# Patient Record
Sex: FEMALE | Race: BLACK OR AFRICAN AMERICAN | Employment: FULL TIME | URBAN - METROPOLITAN AREA
[De-identification: names, ages, dates, MRNs, and addresses within clinical notes are randomized per-mention and may not be internally consistent; named-entity substitution may affect disease eponyms.]

---

## 2017-03-15 ENCOUNTER — OFFICE VISIT (OUTPATIENT)
Dept: ORTHOPEDIC SURGERY | Age: 47
End: 2017-03-15

## 2017-03-15 VITALS
RESPIRATION RATE: 18 BRPM | SYSTOLIC BLOOD PRESSURE: 197 MMHG | TEMPERATURE: 97.9 F | HEART RATE: 88 BPM | OXYGEN SATURATION: 99 % | HEIGHT: 65 IN | WEIGHT: 282 LBS | DIASTOLIC BLOOD PRESSURE: 105 MMHG | BODY MASS INDEX: 46.98 KG/M2

## 2017-03-15 DIAGNOSIS — M46.1 SACROILIITIS (HCC): ICD-10-CM

## 2017-03-15 DIAGNOSIS — M54.50 PAIN OF LUMBAR SPINE: ICD-10-CM

## 2017-03-15 DIAGNOSIS — M48.061 LUMBAR STENOSIS: Primary | ICD-10-CM

## 2017-03-15 RX ORDER — LISINOPRIL AND HYDROCHLOROTHIAZIDE 20; 25 MG/1; MG/1
TABLET ORAL
Refills: 1 | COMMUNITY
Start: 2017-02-02

## 2017-03-15 RX ORDER — GABAPENTIN 300 MG/1
300 CAPSULE ORAL 3 TIMES DAILY
Qty: 90 CAP | Refills: 2 | Status: SHIPPED | OUTPATIENT
Start: 2017-03-15 | End: 2017-03-29 | Stop reason: DRUGHIGH

## 2017-03-15 RX ORDER — ACETAMINOPHEN 325 MG/1
TABLET ORAL
COMMUNITY

## 2017-03-15 NOTE — PROGRESS NOTES
[address], [address]  [place], [address] [place] [address] Street  Phone: [phone]  Fax: [phone]        Aixa Tillman  : 1970  PCP: Barbara Galeano MD  3/15/2017    NEW PATIENT      ASSESSMENT AND PLAN     Onel Vargas comes in to the office today c/o 6/10 lumbar pain that radiates into her bilateral thighs x 5 years. She was previously seeing Dr. Enrique Hernandez who diagnosed her with severe L5-S1 lumbar stenosis and presented a surgical option which she was disinterested in at the time. Pt received a single lumbar epidural injection which provided brief relief. She states she lost her job and was unable to see him again for further treatment because she no longer had insurance. She returns today for treatment of the same problem. She was referred for a lumbar MRI for further evaluation of her current symptoms. Pt was prescribed Gabapentin 300 mg TID. I may reevaluate her potential sacroiliitis at her next visit as well. She was advised to speak to her PCP about her high BP. Pt will f/u in 2 weeks or prn. Rolyrodney Armstrong was seen today for back pain and new patient. Diagnoses and all orders for this visit:    Lumbar stenosis  -     gabapentin (NEURONTIN) 300 mg capsule; Take 1 Cap by mouth three (3) times daily.  -     MRI LUMB SPINE WO CONT; Future    Sacroiliitis (HCC)    Pain of lumbar spine  -     [[id number]] L/S 2-3 views         Follow-up Disposition:  Return in about 2 weeks (around 3/29/2017), or if symptoms worsen or fail to improve. CHIEF COMPLAINT  Onel Vargas is seen today in consultation at the request of BONI Beatty MD for complaints of lumbar pain. HISTORY OF PRESENT ILLNESS  Onel Vargas is a 40 y.o. female c/o 6/10 lumbar pain that radiates into her bilateral thighs x 5 years.  She was previously seeing Dr. Enrique Hernandez who diagnosed her with severe L5-S1 lumbar stenosis and presented a surgical option which she was disinterested in at the time. She states she lost her job and was unable to see him again for further treatment because she no longer had insurance. Sitting, standing, lying down, walking, lifting, and bending forward exacerbate her pain. Changing positions, coughing, and sneezing relieve her pain. She previously had a lumbar injection which provided brief relief. Pt denies any fevers, chills, nausea, vomiting. Pt denies any chest pain and shortness of breath. Pt denies any ear, nose, and throat problems. Pt denies any fecal or urinary incontinence. She is not working. Accompanied by friends. Excerpt from Dr. Curiel London note on 3/24/2012: The patient was seen today for follow-up of low back pain into the left lower extremity extending in an S1 distribution. MRI per report revealed at the L5-S1 level there was severe canal stenosis due to epidural fat hypertrophy with a congenitally narrowed canal and a central disc protrusion. The disc protrusion was contacting upon the S1 nerve roots bilaterally. At her last clinic appointment, she was doing well after a block. She was to continue with Topamax. I returned her to work full duty. She followed back up with the office roughly one week later with an increase in pain. I started her on a Medrol Dosepak, Ultram and Flexeril. She reports she had to discontinue the TOPAMAX due to developing a rash. She reports increased pain since discontinuation of the Topamax. She rates pain to be a 4/10. She reports she can only stand for approximately one hour prior to onset of symptoms. Pain is now extending primarily to the right lower back and into the right lower extremity. Physical examination: The patient is in no apparent distress and is alert and oriented x 3. Sensation is intact to light touch throughout. Motor examination reveals 4+/5 strength throughout the bilateral lower extremities. Straight leg raise is negative bilaterally.     Assessment/plan: She is not interested in surgical intervention. I am going to set her up for a repeat block. I will try her on Neurontin. I will see the patient back after the block. PAST MEDICAL HISTORY   Past Medical History:   Diagnosis Date    Hypertension        History reviewed. No pertinent surgical history. MEDICATIONS        ALLERGIES  No Known Allergies       SOCIAL HISTORY    Social History     Social History    Marital status: LEGALLY      Spouse name: N/A    Number of children: N/A    Years of education: N/A     Social History Main Topics    Smoking status: Never Smoker    Smokeless tobacco: Never Used    Alcohol use No    Drug use: None    Sexual activity: Not Asked     Other Topics Concern    None     Social History Narrative    None       FAMILY HISTORY  No family history on file. REVIEW OF SYSTEMS  Review of Systems   Constitutional: Positive for diaphoresis (night sweats). Negative for chills, fever, malaise/fatigue and weight loss. Respiratory: Negative for shortness of breath. Cardiovascular: Negative for chest pain and leg swelling. Gastrointestinal: Negative for constipation, nausea and vomiting. Neurological: Negative for dizziness, tingling, seizures, loss of consciousness and headaches. Psychiatric/Behavioral: The patient does not have insomnia.           PHYSICAL EXAMINATION  Visit Vitals    BP (!) 197/105    Pulse 88    Temp 97.9 °F (36.6 °C) (Oral)    Resp 18    Ht 5' 5\" (1.651 m)    Wt 282 lb (127.9 kg)    SpO2 99%    BMI 46.93 kg/m2         Pain Assessment  3/15/2017   Location of Pain Back   Location Modifiers (No Data)   Severity of Pain 6   Quality of Pain Aching   Quality of Pain Comment N/T legs   Duration of Pain Persistent   Frequency of Pain Constant   Date Pain First Started (No Data)   Aggravating Factors Standing   Aggravating Factors Comment sitting, lying, lifting, bending   Limiting Behavior Yes         Constitutional:  Well developed, well nourished, in no acute distress. Psychiatric: Affect and mood are appropriate. HEENT: Normocephalic, atraumatic. Extraocular movements intact. Integumentary: No rashes or abrasions noted on exposed areas. Cardiovascular: Regular rate and rhythm. Pulmonary: Clear to auscultation bilaterally. SPINE/MUSCULOSKELETAL EXAM    Cervical spine:  Neck is midline. Normal muscle tone. No focal atrophy is noted. ROM pain free. Shoulder ROM intact. Mild tenderness to palpation. Negative Spurling's sign. Negative Tinel's sign. Negative Zazueta's sign. Sensation in the bilateral arms grossly intact to light touch. Lumbar spine:  No rash, ecchymosis, or gross obliquity. No fasciculations. No focal atrophy is noted. No pain with hip ROM. Full range of motion. Tenderness to palpation. No tenderness to palpation at the sciatic notch. Bilateral SI joints tender. Trochanters non tender. Sensation in the bilateral legs grossly intact to light touch. MOTOR:      Biceps  Triceps Deltoids Wrist Ext Wrist Flex Hand Intrin   Right 5/5 5/5 5/5 5/5 5/5 5/5   Left 5/5 5/5 5/5 5/5 5/5 5/5             Hip Flex  Quads Hamstrings Ankle DF EHL Ankle PF   Right 5/5 5/5 5/5 5/5 5/5 5/5   Left 5/5 5/5 5/5 5/5 5/5 5/5     Trace reflexes in biceps, triceps, brachioradialis, patella, and Achilles. Negative Straight Leg raise. Squat not tested. No difficulty with tandem gait. Ambulation without assistive device. FWB. RADIOGRAPHS  Lumbar XR images taken on 3/15/2017 personally reviewed with patient:  1) Good overall alignment  2) Disc space narrowing at L5-S1  3) No obvious fractures or instabilities     reviewed    Ms. Corey Villarreal has a reminder for a \"due or due soon\" health maintenance. I have asked that she contact her primary care provider for follow-up on this health maintenance. This plan was reviewed with the patient and patient agrees. All questions were answered.  More than half of this visit today was spent on counseling. Written by Kiersten Brown, as dictated by Dr. Humera Starks. I, Dr. Humera Starks, confirm that all documentation is accurate.

## 2017-03-15 NOTE — PATIENT INSTRUCTIONS
Gabapentin (Neurontin) 300 mg three times a day (TID) daily instructions:       Morning Afternoon Night   Week 1 - - 1 pill   Week 2 1 pill - 1 pill   Week 3 and onwards 1 pill 1 pill 1 pill     Week 1: Take one pill each night. Week 2: Take one pill in the morning and one pill at night. Week 3 and onwards: Take one pill in the morning, one pill in the afternoon, and one pill at night. Continue taking this dose each day.

## 2017-03-15 NOTE — MR AVS SNAPSHOT
Visit Information Date & Time Provider Department Dept. Phone Encounter #  
 3/15/2017 10:00 AM Wilton Dennis, [address] [facility] [address] [facility] [id number] Follow-up Instructions Return in about 2 weeks (around 3/29/2017), or if symptoms worsen or fail to improve. Your Appointments 3/29/2017  9:45 AM  
Follow Up with Wilton Dennis MD  
[address] [facility], [address] [facility]) Appt Note: 2 wk fu  
 [address] [facility] [address]  
[phone]  
  
   
 [address] [facility] [address] Upcoming Health Maintenance Date Due DTaP/Tdap/Td series (1 - Tdap) 11/18/1992 PAP AKA CERVICAL CYTOLOGY 11/18/1992 INFLUENZA AGE 9 TO ADULT 8/1/2016 Allergies as of 3/15/2017  Review Complete On: 3/15/2017 By: Hannah Rosario No Known Allergies Current Immunizations  Never Reviewed No immunizations on file. Not reviewed this visit You Were Diagnosed With   
  
 Codes Comments Lumbar stenosis    -  Primary ICD-10-CM: M48.06 
ICD-9-CM: 724.02 [facility]     ICD-10-CM: M46.1 ICD-9-CM: 720.2 Pain of lumbar spine     ICD-10-CM: M54.5 ICD-9-CM: 724.2 Vitals BP Pulse Temp Resp Height(growth percentile) Weight(growth percentile) (!) 197/105 88 97.9 °F (36.6 °C) (Oral) 18 5' 5\" (1.651 m) 282 lb (127.9 kg) SpO2 BMI Smoking Status 99% 46.93 kg/m2 Never Smoker BMI and BSA Data Body Mass Index Body Surface Area  
 46.93 kg/m 2 2.42 m 2 Preferred Pharmacy Pharmacy Name Phone [facility] [address] [place], [address] Your Updated Medication List  
  
   
This list is accurate as of: 3/15/17 11:18 AM.  Always use your most recent med list.  
  
  
  
  
 BC HEADACHE POWDER PO Take  by mouth.  
  
 gabapentin 300 mg capsule Commonly known as:  NEURONTIN Take 1 Cap by mouth three (3) times daily. lisinopril-hydroCHLOROthiazide 20-25 mg per tablet Commonly known as:  PRINZIDE, ZESTORETIC  
TAKE 1 TABLET BY MOUTH DAILY  
  
 TYLENOL 325 mg tablet Generic drug:  acetaminophen Take  by mouth every four (4) hours as needed for Pain. Prescriptions Sent to Pharmacy Refills  
 gabapentin (NEURONTIN) 300 mg capsule 2 Sig: Take 1 Cap by mouth three (3) times daily. Class: Normal  
 Pharmacy: [address] [place] [address], [address] Ph #: [phone] Route: Oral  
  
We Performed the Following AMB POC XRAY, SPINE, LUMBOSACRAL; 2 O [20327 CPT(R)] Follow-up Instructions Return in about 2 weeks (around 3/29/2017), or if symptoms worsen or fail to improve. To-Do List   
 03/22/2017 Imaging:  MRI LUMB SPINE WO CONT Patient Instructions Gabapentin (Neurontin) 300 mg three times a day (TID) daily instructions: 
 
 
 Morning Afternoon Night Week 1 - - 1 pill Week 2 1 pill - 1 pill Week 3 and onwards 1 pill 1 pill 1 pill Week 1: Take one pill each night. Week 2: Take one pill in the morning and one pill at night. Week 3 and onwards: Take one pill in the morning, one pill in the afternoon, and one pill at night. Continue taking this dose each day. Introducing [facility]! David Grace introduces [employer] patient portal. Now you can access parts of your medical record, email your doctor's office, and request medication refills online. 1. In your internet browser, go to [email or web address] 2. Click on the First Time User? Click Here link in the Sign In box. You will see the New Member Sign Up page. 3. Enter your [employer] Access Code exactly as it appears below. You will not need to use this code after youve completed the sign-up process.  If you do not sign up before the expiration date, you must request a new code. 
 
· [employer] Access Code: [email or web address] Expires: 6/13/2017 10:00 AM 
 
4. Enter the last four digits of your Social Security Number (xxxx) and Date of Birth (mm/dd/yyyy) as indicated and click Submit. You will be taken to the next sign-up page. 5. Create a [employer] ID. This will be your [employer] login ID and cannot be changed, so think of one that is secure and easy to remember. 6. Create a [employer] password. You can change your password at any time. 7. Enter your Password Reset Question and Answer. This can be used at a later time if you forget your password. 8. Enter your e-mail address. You will receive e-mail notification when new information is available in [address] 9. Click Sign Up. You can now view and download portions of your medical record. 10. Click the Download Summary menu link to download a portable copy of your medical information. If you have questions, please visit the Frequently Asked Questions section of the [employer] website. Remember, [employer] is NOT to be used for urgent needs. For medical emergencies, dial 911. Now available from your iPhone and Android! Please provide this summary of care documentation to your next provider. Your primary care clinician is listed as BONI Burris. If you have any questions after today's visit, please call [phone].

## 2017-03-25 ENCOUNTER — HOSPITAL ENCOUNTER (OUTPATIENT)
Age: 47
Discharge: HOME | End: 2017-03-25
Attending: PHYSICAL MEDICINE & REHABILITATION
Payer: COMMERCIAL

## 2017-03-25 DIAGNOSIS — M48.061 LUMBAR STENOSIS: ICD-10-CM

## 2017-03-25 PROCEDURE — 72148 MRI LUMBAR SPINE W/O DYE: CPT

## 2017-03-29 ENCOUNTER — OFFICE VISIT (OUTPATIENT)
Dept: ORTHOPEDIC SURGERY | Age: 47
End: 2017-03-29

## 2017-03-29 VITALS
BODY MASS INDEX: 46.62 KG/M2 | DIASTOLIC BLOOD PRESSURE: 77 MMHG | HEIGHT: 65 IN | WEIGHT: 279.8 LBS | RESPIRATION RATE: 18 BRPM | HEART RATE: 91 BPM | SYSTOLIC BLOOD PRESSURE: 127 MMHG | TEMPERATURE: 98.2 F

## 2017-03-29 DIAGNOSIS — M46.1 SACROILIITIS (HCC): Primary | ICD-10-CM

## 2017-03-29 DIAGNOSIS — M51.26 LUMBAR HERNIATED DISC: ICD-10-CM

## 2017-03-29 DIAGNOSIS — M54.16 LUMBAR RADICULOPATHY: ICD-10-CM

## 2017-03-29 RX ORDER — GABAPENTIN 300 MG/1
600 CAPSULE ORAL 3 TIMES DAILY
Qty: 180 CAP | Refills: 2 | Status: SHIPPED | OUTPATIENT
Start: 2017-03-29

## 2017-03-29 NOTE — PROGRESS NOTES
[facility]  [address], [address]  [place] [address]  Phone: [phone]  Fax: [phone]        Gabby Martinez  : 1970  PCP: Francia Tidwell MD  3/29/2017    PROGRESS NOTE      ASSESSMENT AND PLAN    Rodolfo Mccollum comes in to the office today for a lumbar MRI f/u. The MRI shows a central annular fissure with disc protrusion at L5-S1 which is slightly displacing the bilateral crossing S1 nerve roots L>R. Her pain today is in the R>L bilateral thighs. Reexamination shows that a component of her pain may actually be attributed to sacroiliitis. I would like to consider both a bilateral L5-S1 SNRB and a right SI joint injection. We will start with the right SI joint injection and see how much pain it relieves. She was prescribed an increased dose of Gabapentin (2x 300 mg TID from 300 mg TID). Pt will f/u in 3 weeks or prn. Elpidio Trevino was seen today for back pain. Diagnoses and all orders for this visit:    Sacroiliitis ([facility] [address]  -     SCHEDULE SURGERY    Lumbar herniated disc  -     gabapentin (NEURONTIN) 300 mg capsule; Take 2 Caps by mouth three (3) times daily. Lumbar radiculopathy  -     gabapentin (NEURONTIN) 300 mg capsule; Take 2 Caps by mouth three (3) times daily. Follow-up Disposition:  Return in about 3 weeks (around 2017), or if symptoms worsen or fail to improve. HISTORY OF PRESENT ILLNESS  Rodolfo Mccollum is a 40 y.o. female. A&P / HPI from 3/15/2017:  Rodolfo Mccollum comes in to the office today c/o 6/10 lumbar pain that radiates into her bilateral thighs x 5 years. She was previously seeing Dr. Rick Kent who diagnosed her with severe L5-S1 lumbar stenosis and presented a surgical option which she was disinterested in at the time. Pt received a single lumbar epidural injection which provided brief relief.  She states she lost her job and was unable to see him again for further treatment because she no longer had insurance. She returns today for treatment of the same problem.     She was referred for a lumbar MRI for further evaluation of her current symptoms. Pt was prescribed Gabapentin 300 mg TID. I may reevaluate her potential sacroiliitis at her next visit as well.     She was advised to speak to her PCP about her high BP.     Pt will f/u in 2 weeks or prn. Updates from 03/29/17:  Pt presents for a lumbar MRI f/u. The MRI shows a central annular fissure with disc protrusion at L5-S1 which is slightly displacing the bilateral crossing S1 nerve roots L>R. Her pain today is in the R>L bilateral thighs. Reexamination shows that a component of her pain may actually be attributed to sacroiliitis. PAST MEDICAL HISTORY   Past Medical History:   Diagnosis Date    Hypertension        History reviewed. No pertinent surgical history. Kingsley Mcmahan MEDICATIONS      Current Outpatient Prescriptions   Medication Sig Dispense Refill    lisinopril-hydroCHLOROthiazide (PRINZIDE, ZESTORETIC) 20-25 mg per tablet TAKE 1 TABLET BY MOUTH DAILY  1    ASPIRIN/SALICYLAMIDE/CAFFEINE (BC HEADACHE POWDER PO) Take  by mouth.  acetaminophen (TYLENOL) 325 mg tablet Take  by mouth every four (4) hours as needed for Pain.  gabapentin (NEURONTIN) 300 mg capsule Take 1 Cap by mouth three (3) times daily. 90 Cap 2        ALLERGIES  No Known Allergies       SOCIAL HISTORY    Social History     Social History    Marital status:      Spouse name: N/A    Number of children: N/A    Years of education: N/A     Social History Main Topics    Smoking status: Never Smoker    Smokeless tobacco: Never Used    Alcohol use No    Drug use: None    Sexual activity: Not Asked     Other Topics Concern    None     Social History Narrative       FAMILY HISTORY  No family history on file. REVIEW OF SYSTEMS  Review of Systems   Constitutional: Negative for chills, diaphoresis, fever, malaise/fatigue and weight loss.    Respiratory: Negative for shortness of breath. Cardiovascular: Negative for chest pain and leg swelling. Gastrointestinal: Negative for constipation, nausea and vomiting. Neurological: Negative for dizziness, tingling, seizures, loss of consciousness and headaches. Psychiatric/Behavioral: The patient does not have insomnia. PHYSICAL EXAMINATION  Visit Vitals    /77    Pulse 91    Temp 98.2 °F (36.8 °C) (Oral)    Resp 18    Ht 5' 5\" (1.651 m)    Wt 279 lb 12.8 oz (126.9 kg)    BMI 46.56 kg/m2       Pain Assessment  3/29/2017   Location of Pain Back   Location Modifiers -   Severity of Pain 6   Quality of Pain Aching   Quality of Pain Comment N/T   Duration of Pain Persistent   Frequency of Pain Constant   Date Pain First Started -   Aggravating Factors -   Aggravating Factors Comment -   Limiting Behavior -           Constitutional:  Well developed, well nourished, in no acute distress. Psychiatric: Affect and mood are appropriate. Integumentary: No rashes or abrasions noted on exposed areas. SPINE/MUSCULOSKELETAL EXAM    Cervical spine:  Neck is midline. Normal muscle tone. No focal atrophy is noted. ROM pain free. Shoulder ROM intact. Mild tenderness to palpation. Negative Spurling's sign. Negative Tinel's sign. Negative Zazueta's sign.       Sensation in the bilateral arms grossly intact to light touch.      Lumbar spine:  No rash, ecchymosis, or gross obliquity. No fasciculations. No focal atrophy is noted. No pain with hip ROM. Full range of motion. Tenderness to palpation. No tenderness to palpation at the sciatic notch. Bilateral SI joints tender. Trochanters non tender.      Sensation in the bilateral legs grossly intact to light touch. Updates from 03/29/17:  Positive right JOSE MARTIN test. Mildly positive left. Decreased sensation in right leg compared to left.     MOTOR:      Biceps  Triceps Deltoids Wrist Ext Wrist Flex Hand Intrin   Right 5/5 5/5 5/5 5/5 5/5 5/5   Left 5/5 5/5 5/5 5/5 5/5 5/5             Hip Flex  Quads Hamstrings Ankle DF EHL Ankle PF   Right 5/5 5/5 5/5 5/5 5/5 5/5   Left 5/5 5/5 5/5 5/5 5/5 5/5     Trace reflexes in biceps, triceps, brachioradialis, patella, and Achilles.     Negative Straight Leg raise. Squat not tested. No difficulty with tandem gait.      Ambulation without assistive device. FWB.       RADIOGRAPHS  Lumbar XR images taken on 3/15/2017 personally reviewed with patient:  1) Good overall alignment  2) Disc space narrowing at L5-S1  3) No obvious fractures or instabilities    Lumbar MRI images taken on 3/25/2017 personally reviewed with patient:  FINDINGS:  There is normal alignment of the lumbar spine. Vertebral body heights are  maintained. Disc desiccation and mild disc height loss L3/4 and L5/S1. Multilevel mild discogenic reactive bone marrow changes with mild discogenic  edema at right L5/S1 suggesting inflammation. The conus medullaris terminates at  upper L2 level.     Lower imaged thoracic spine T10/11, T11/12 and T12/L1 demonstrate unremarkable  disc with patent central canal and foramina.      Correlation of sagittal and axial images demonstrates the following:     L1/2: Unremarkable disc. The spinal canal and neural foramina are widely patent.     L2/3: Unremarkable disc. The spinal canal and neural foramina are widely  patent.     L3/4: Mild disc height loss and circumferential disc bulge. No central canal or  foraminal stenosis.     L4/5: Unremarkable disc. The spinal canal and neural foramina are widely  patent.      L5/S1: Central annular fissure with disc protrusion encroaching and slightly  displacing bilateral crossing S1 nerve roots slightly more towards the left  (series 6 images 5-6). Otherwise central canal patent.  Patent neural foramina.     No incidental abnormality in the partially imaged retroperitoneal structures.     IMPRESSION  IMPRESSION:      L5/S1 central annular fissure and disc protrusion encroaching and displacing  bilateral crossing S1 nerve roots.       reviewed     Ms. Hero Goodrich has a reminder for a \"due or due soon\" health maintenance. I have asked that she contact her primary care provider for follow-up on this health maintenance.      This plan was reviewed with the patient and patient agrees. All questions were answered. More than half of this visit today was spent on counseling.     Written by Ekaterina Baires, as dictated by Dr. Katia Sage, Dr. Jarek Proctor, confirm that all documentation is accurate.

## 2017-03-29 NOTE — MR AVS SNAPSHOT
Visit Information Date & Time Provider Department Dept. Phone Encounter #  
 3/29/2017  9:45 AM Carlos Dominguez MD [facility] [phone] [id number] Follow-up Instructions Return in about 3 weeks (around 4/19/2017), or if symptoms worsen or fail to improve. Follow-up and Disposition History Upcoming Health Maintenance Date Due DTaP/Tdap/Td series (1 - Tdap) 11/18/1992 PAP AKA CERVICAL CYTOLOGY 11/18/1992 INFLUENZA AGE 9 TO ADULT 8/1/2016 Allergies as of 3/29/2017  Review Complete On: 3/29/2017 By: Carlos Dominguez MD  
 No Known Allergies Current Immunizations  Never Reviewed No immunizations on file. Not reviewed this visit You Were Diagnosed With   
  
 Codes Comments [facility]    -  Primary ICD-10-CM: M46.1 ICD-9-CM: 720.2 Lumbar herniated disc     ICD-10-CM: M51.26 
ICD-9-CM: 722.10 Lumbar radiculopathy     ICD-10-CM: M54.16 
ICD-9-CM: 724.4 Vitals BP Pulse Temp Resp Height(growth percentile) Weight(growth percentile) 127/77 91 98.2 °F (36.8 °C) (Oral) 18 5' 5\" (1.651 m) 279 lb 12.8 oz (126.9 kg) BMI Smoking Status 46.56 kg/m2 Never Smoker BMI and BSA Data Body Mass Index Body Surface Area  
 46.56 kg/m 2 2.41 m 2 Preferred Pharmacy Pharmacy Name Phone [id number], [address] [place] [address] Your Updated Medication List  
  
   
This list is accurate as of: 3/29/17 11:16 AM.  Always use your most recent med list.  
  
  
  
  
 BC HEADACHE POWDER PO Take  by mouth.  
  
 gabapentin 300 mg capsule Commonly known as:  NEURONTIN Take 2 Caps by mouth three (3) times daily. lisinopril-hydroCHLOROthiazide 20-25 mg per tablet Commonly known as:  PRINZIDE, ZESTORETIC  
TAKE 1 TABLET BY MOUTH DAILY  
  
 TYLENOL 325 mg tablet Generic drug:  acetaminophen Take  by mouth every four (4) hours as needed for Pain. Prescriptions Sent to Pharmacy Refills  
 gabapentin (NEURONTIN) 300 mg capsule 2 Sig: Take 2 Caps by mouth three (3) times daily. Class: Normal  
 Pharmacy: [address] [place] [address] 107, Kem Odonnell  #: [phone] Route: Oral  
  
We Performed the Following SCHEDULE SURGERY [[id number] Custom] Follow-up Instructions Return in about 3 weeks (around 2017), or if symptoms worsen or fail to improve. Patient Instructions Aleahart Activation Thank you for requesting access to Beamr. Please follow the instructions below to securely access and download your online medical record. Beamr allows you to send messages to your doctor, view your test results, renew your prescriptions, schedule appointments, and more. How Do I Sign Up? 1. In your internet browser, go to [email or web address] 
2. Click on the First Time User? Click Here link in the Sign In box. You will be redirect to the New Member Sign Up page. 3. Enter your Beamr Access Code exactly as it appears below. You will not need to use this code after youve completed the sign-up process. If you do not sign up before the expiration date, you must request a new code. Beamr Access Code: [email or web address] Expires: 2017 10:00 AM (This is the date your Beamr access code will ) 4. Enter the last four digits of your Social Security Number (xxxx) and Date of Birth (mm/dd/yyyy) as indicated and click Submit. You will be taken to the next sign-up page. 5. Create a Beamr ID. This will be your Beamr login ID and cannot be changed, so think of one that is secure and easy to remember. 6. Create a Beamr password. You can change your password at any time. 7. Enter your Password Reset Question and Answer. This can be used at a later time if you forget your password. 8. Enter your e-mail address. You will receive e-mail notification when new information is available in [address] 9. Click Sign Up. You can now view and download portions of your medical record. 10. Click the Download Summary menu link to download a portable copy of your medical information. Additional Information If you have questions, please visit the Frequently Asked Questions section of the [employer] website at [email or web address] Remember, [employer] is NOT to be used for urgent needs. For medical emergencies, dial 911. Sacroiliac Pain: Exercises Your Care Instructions Here are some examples of typical rehabilitation exercises for your condition. Start each exercise slowly. Ease off the exercise if you start to have pain. Your doctor or physical therapist will tell you when you can start these exercises and which ones will work best for you. How to do the exercises Knee-to-chest stretch Do not do the knee-to-chest exercise if it causes or increases back or leg pain. 1. Lie on your back with your knees bent and your feet flat on the floor. You can put a small pillow under your head and neck if it is more comfortable. 2. Grasp your hands under one knee and bring the knee to your chest, keeping the other foot flat on the floor. 3. Keep your lower back pressed to the floor. Hold for at least 15 to 30 seconds. 4. Relax and lower the knee to the starting position. Repeat with the other leg. 5. Repeat 2 to 4 times with each leg. 6. To get more stretch, keep your other leg flat on the floor while pulling your knee to your chest. 
Bridging 1. Lie on your back with both knees bent. Your knees should be bent about 90 degrees. 2. Tighten your belly muscles by pulling in your belly button toward your spine. Then push your feet into the floor, squeeze your buttocks, and lift your hips off the floor until your shoulders, hips, and knees are all in a straight line. 3. Hold for about 6 seconds as you continue to breathe normally, and then slowly lower your hips back down to the floor and rest for up to 10 seconds. 4. Repeat 8 to 12 times. Hip extension 1. Get down on your hands and knees on the floor. 2. Keeping your back and neck straight, lift one leg straight out behind you. When you lift your leg, keep your hips level. Don't let your back twist, and don't let your hip drop toward the floor. 3. Hold for 6 seconds. Repeat 8 to 12 times with each leg. 4. If you feel steady and strong when you do this exercise, you can make it more difficult. To do this, when you lift your leg, also lift the opposite arm straight out in front of you. For example, lift the left leg and the right arm at the same time. (This is sometimes called the \"bird dog exercise. \") Hold for 6 seconds, and repeat 8 to 12 times on each side. Clamshell 1. Lie on your side with a pillow under your head. Keep your feet and knees together and your knees bent. 2. Raise your top knee, but keep your feet together. Do not let your hips roll back. Your legs should open up like a clamshell. 3. Hold for 6 seconds. 4. Slowly lower your knee back down. Rest for 10 seconds. 5. Repeat 8 to 12 times. 6. Switch to your other side and repeat steps 1 through 5. Hamstring wall stretch 1. Lie on your back in a doorway, with one leg through the open door. 2. Slide your affected leg up the wall to straighten your knee. You should feel a gentle stretch down the back of your leg. ¨ Do not arch your back. ¨ Do not bend either knee. ¨ Keep one heel touching the floor and the other heel touching the wall. Do not point your toes. 3. Hold the stretch for at least 1 minute to begin. Then try to lengthen the time you hold the stretch to as long as 6 minutes. 4. Switch legs, and repeat steps 1 through 3. 
5. Repeat 2 to 4 times.  
If you do not have a place to do this exercise in a doorway, there is another way to do it: 1. Lie on your back, and bend one knee. 2. Loop a towel under the ball and toes of that foot, and hold the ends of the towel in your hands. 3. Straighten your knee, and slowly pull back on the towel. You should feel a gentle stretch down the back of your leg. 4. Switch legs, and repeat steps 1 through 3. 
5. Repeat 2 to 4 times. Lower abdominal strengthening 1. Lie on your back with your knees bent and your feet flat on the floor. 2. Tighten your belly muscles by pulling your belly button in toward your spine. 3. Lift one foot off the floor and bring your knee toward your chest, so that your knee is straight above your hip and your leg is bent like the letter \"L. \" 
4. Lift the other knee up to the same position. 5. Lower one leg at a time to the starting position. 6. Keep alternating legs until you have lifted each leg 8 to 12 times. 7. Be sure to keep your belly muscles tight and your back still as you are moving your legs. Be sure to breathe normally. Piriformis stretch 1. Lie on your back with your legs straight. 2. Lift your affected leg, and bend your knee. With your opposite hand, reach across your body, and then gently pull your knee toward your opposite shoulder. 3. Hold the stretch for 15 to 30 seconds. 4. Switch legs and repeat steps 1 through 3. 
5. Repeat 2 to 4 times. Follow-up care is a key part of your treatment and safety. Be sure to make and go to all appointments, and call your doctor if you are having problems. It's also a good idea to know your test results and keep a list of the medicines you take. Where can you learn more? Go to http://cory-lucho.info/. Enter C027 in the search box to learn more about \"Sacroiliac Pain: Exercises. \" Current as of: May 23, 2016 Content Version: 11.2 © 7498-0781 [employer].  Care instructions adapted under license by [employer] (which disclaims liability or warranty for this information). If you have questions about a medical condition or this instruction, always ask your healthcare professional. [address] any warranty or liability for your use of this information. Gabapentin (Neurontin) instructions: We will increase your current dose from 300 mg (1 pill) three times a day to 600 mg (2 pills) three times a day. Morning Afternoon Night Week 1 1 pill 1 pill 2 pills Week 2 2 pills 1 pill 2 pills Week 3 and onwards 2 pills 2 pills 2 pills Week 1: Take an additional 300 mg pill at night to your current dose so that you are taking 1 pill in the morning, 1 pill in the afternoon, and 2 pills at night. Week 2: Take an additional 300 mg pill in the morning so that you are taking 2 pills in the morning, 1 pill in the afternoon, and 2 pills at night. Week 3 and onwards: Take an additional 300 mg pill in the afternoon so that you are taking 2 pills in the morning, 2 pills in the afternoon, and 2 pills at night. Continue taking this dose each day. Patient Instructions History Introducing [facility]! Jason Roper introduces AutomateIt patient portal. Now you can access parts of your medical record, email your doctor's office, and request medication refills online. 1. In your internet browser, go to [email or web address] It 2. Click on the First Time User? Click Here link in the Sign In box. You will see the New Member Sign Up page. 3. Enter your AutomateIt Access Code exactly as it appears below. You will not need to use this code after youve completed the sign-up process. If you do not sign up before the expiration date, you must request a new code. · [employer] Access Code: [email or web address] Expires: 6/13/2017 10:00 AM 
 
4. Enter the last four digits of your Social Security Number (xxxx) and Date of Birth (mm/dd/yyyy) as indicated and click Submit. You will be taken to the next sign-up page. 5. Create a Giritech ID. This will be your [employer] login ID and cannot be changed, so think of one that is secure and easy to remember. 6. Create a Giritech password. You can change your password at any time. 7. Enter your Password Reset Question and Answer. This can be used at a later time if you forget your password. 8. Enter your e-mail address. You will receive e-mail notification when new information is available in [address] 9. Click Sign Up. You can now view and download portions of your medical record. 10. Click the Download Summary menu link to download a portable copy of your medical information. If you have questions, please visit the Frequently Asked Questions section of the [employer] website. Remember, [employer] is NOT to be used for urgent needs. For medical emergencies, dial 911. Now available from your iPhone and Android! Please provide this summary of care documentation to your next provider. Your primary care clinician is listed as BONI Diallo. If you have any questions after today's visit, please call [phone].

## 2017-03-29 NOTE — PATIENT INSTRUCTIONS
[employer] Activation    Thank you for requesting access to [employer]. Please follow the instructions below to securely access and download your online medical record. [employer] allows you to send messages to your doctor, view your test results, renew your prescriptions, schedule appointments, and more. How Do I Sign Up? 1. In your internet browser, go to [email or web address]  2. Click on the First Time User? Click Here link in the Sign In box. You will be redirect to the New Member Sign Up page. 3. Enter your [employer] Access Code exactly as it appears below. You will not need to use this code after youve completed the sign-up process. If you do not sign up before the expiration date, you must request a new code. [employer] Access Code: [email or web address]  Expires: 2017 10:00 AM (This is the date your [employer] access code will )    4. Enter the last four digits of your Social Security Number (xxxx) and Date of Birth (mm/dd/yyyy) as indicated and click Submit. You will be taken to the next sign-up page. 5. Create a [employer] ID. This will be your [employer] login ID and cannot be changed, so think of one that is secure and easy to remember. 6. Create a [employer] password. You can change your password at any time. 7. Enter your Password Reset Question and Answer. This can be used at a later time if you forget your password. 8. Enter your e-mail address. You will receive e-mail notification when new information is available in [address] 9. Click Sign Up. You can now view and download portions of your medical record. 10. Click the Download Summary menu link to download a portable copy of your medical information. Additional Information    If you have questions, please visit the Frequently Asked Questions section of the [employer] website at [email or web address] Remember, [employer] is NOT to be used for urgent needs. For medical emergencies, dial 911.          Sacroiliac Pain: Exercises  Your Care Instructions  Here are some examples of typical rehabilitation exercises for your condition. Start each exercise slowly. Ease off the exercise if you start to have pain. Your doctor or physical therapist will tell you when you can start these exercises and which ones will work best for you. How to do the exercises  Knee-to-chest stretch    Do not do the knee-to-chest exercise if it causes or increases back or leg pain. 1. Lie on your back with your knees bent and your feet flat on the floor. You can put a small pillow under your head and neck if it is more comfortable. 2. Grasp your hands under one knee and bring the knee to your chest, keeping the other foot flat on the floor. 3. Keep your lower back pressed to the floor. Hold for at least 15 to 30 seconds. 4. Relax and lower the knee to the starting position. Repeat with the other leg. 5. Repeat 2 to 4 times with each leg. 6. To get more stretch, keep your other leg flat on the floor while pulling your knee to your chest.  Bridging    1. Lie on your back with both knees bent. Your knees should be bent about 90 degrees. 2. Tighten your belly muscles by pulling in your belly button toward your spine. Then push your feet into the floor, squeeze your buttocks, and lift your hips off the floor until your shoulders, hips, and knees are all in a straight line. 3. Hold for about 6 seconds as you continue to breathe normally, and then slowly lower your hips back down to the floor and rest for up to 10 seconds. 4. Repeat 8 to 12 times. Hip extension    1. Get down on your hands and knees on the floor. 2. Keeping your back and neck straight, lift one leg straight out behind you. When you lift your leg, keep your hips level. Don't let your back twist, and don't let your hip drop toward the floor. 3. Hold for 6 seconds. Repeat 8 to 12 times with each leg. 4. If you feel steady and strong when you do this exercise, you can make it more difficult.  To do this, when you lift your leg, also lift the opposite arm straight out in front of you. For example, lift the left leg and the right arm at the same time. (This is sometimes called the \"bird dog exercise. \") Hold for 6 seconds, and repeat 8 to 12 times on each side. Clamshell    1. Lie on your side with a pillow under your head. Keep your feet and knees together and your knees bent. 2. Raise your top knee, but keep your feet together. Do not let your hips roll back. Your legs should open up like a clamshell. 3. Hold for 6 seconds. 4. Slowly lower your knee back down. Rest for 10 seconds. 5. Repeat 8 to 12 times. 6. Switch to your other side and repeat steps 1 through 5. Hamstring wall stretch    1. Lie on your back in a doorway, with one leg through the open door. 2. Slide your affected leg up the wall to straighten your knee. You should feel a gentle stretch down the back of your leg. ¨ Do not arch your back. ¨ Do not bend either knee. ¨ Keep one heel touching the floor and the other heel touching the wall. Do not point your toes. 3. Hold the stretch for at least 1 minute to begin. Then try to lengthen the time you hold the stretch to as long as 6 minutes. 4. Switch legs, and repeat steps 1 through 3.  5. Repeat 2 to 4 times. If you do not have a place to do this exercise in a doorway, there is another way to do it:  1. Lie on your back, and bend one knee. 2. Loop a towel under the ball and toes of that foot, and hold the ends of the towel in your hands. 3. Straighten your knee, and slowly pull back on the towel. You should feel a gentle stretch down the back of your leg. 4. Switch legs, and repeat steps 1 through 3.  5. Repeat 2 to 4 times. Lower abdominal strengthening    1. Lie on your back with your knees bent and your feet flat on the floor. 2. Tighten your belly muscles by pulling your belly button in toward your spine.   3. Lift one foot off the floor and bring your knee toward your chest, so that your knee is straight above your hip and your leg is bent like the letter \"L. \"  4. Lift the other knee up to the same position. 5. Lower one leg at a time to the starting position. 6. Keep alternating legs until you have lifted each leg 8 to 12 times. 7. Be sure to keep your belly muscles tight and your back still as you are moving your legs. Be sure to breathe normally. Piriformis stretch    1. Lie on your back with your legs straight. 2. Lift your affected leg, and bend your knee. With your opposite hand, reach across your body, and then gently pull your knee toward your opposite shoulder. 3. Hold the stretch for 15 to 30 seconds. 4. Switch legs and repeat steps 1 through 3.  5. Repeat 2 to 4 times. Follow-up care is a key part of your treatment and safety. Be sure to make and go to all appointments, and call your doctor if you are having problems. It's also a good idea to know your test results and keep a list of the medicines you take. Where can you learn more? Go to http://cory-lucho.info/. Enter V392 in the search box to learn more about \"Sacroiliac Pain: Exercises. \"  Current as of: May 23, 2016  Content Version: 11.2  © 6505-3170 [employer]. Care instructions adapted under license by [employer] (which disclaims liability or warranty for this information). If you have questions about a medical condition or this instruction, always ask your healthcare professional. [place] [address] any warranty or liability for your use of this information. Gabapentin (Neurontin) instructions: We will increase your current dose from 300 mg (1 pill) three times a day to 600 mg (2 pills) three times a day.      Morning Afternoon Night   Week 1 1 pill 1 pill 2 pills   Week 2 2 pills 1 pill 2 pills   Week 3 and onwards 2 pills 2 pills 2 pills       Week 1: Take an additional 300 mg pill at night to your current dose so that you are taking 1 pill in the morning, 1 pill in the afternoon, and 2 pills at night. Week 2: Take an additional 300 mg pill in the morning so that you are taking 2 pills in the morning, 1 pill in the afternoon, and 2 pills at night. Week 3 and onwards: Take an additional 300 mg pill in the afternoon so that you are taking 2 pills in the morning, 2 pills in the afternoon, and 2 pills at night. Continue taking this dose each day.

## 2017-04-26 ENCOUNTER — OFFICE VISIT (OUTPATIENT)
Dept: ORTHOPEDIC SURGERY | Age: 47
End: 2017-04-26

## 2017-04-26 VITALS
DIASTOLIC BLOOD PRESSURE: 69 MMHG | HEIGHT: 65 IN | RESPIRATION RATE: 16 BRPM | HEART RATE: 78 BPM | BODY MASS INDEX: 46.32 KG/M2 | TEMPERATURE: 98.1 F | SYSTOLIC BLOOD PRESSURE: 128 MMHG | WEIGHT: 278 LBS | OXYGEN SATURATION: 99 %

## 2017-04-26 DIAGNOSIS — M79.18 MYOFASCIAL PAIN: ICD-10-CM

## 2017-04-26 DIAGNOSIS — M54.16 LUMBAR RADICULOPATHY: ICD-10-CM

## 2017-04-26 DIAGNOSIS — M46.1 SACROILIITIS (HCC): ICD-10-CM

## 2017-04-26 DIAGNOSIS — M51.26 LUMBAR HERNIATED DISC: Primary | ICD-10-CM

## 2017-04-26 DIAGNOSIS — M25.561 PAIN IN BOTH KNEES, UNSPECIFIED CHRONICITY: ICD-10-CM

## 2017-04-26 DIAGNOSIS — M25.562 PAIN IN BOTH KNEES, UNSPECIFIED CHRONICITY: ICD-10-CM

## 2017-04-26 NOTE — PROGRESS NOTES
[address]  [address], [address]  [place], [address] [place] [address] Street  Phone: [phone]  Fax: [phone]        Kameron Monson  : 1970  PCP: Venkatesh Lux MD  2017    PROGRESS NOTE      ASSESSMENT AND PLAN    Liliam Back comes in to the office today for a right SI joint injection f/u. She was unable to have the injection due to financial issues but pt appears to have decreased pain in her SI joints today. She mentions that she has likely fulfilled the requirements for an injection at this point. We previously discussed trying bilateral L5 and S1 SNRBs in addition to the right SI joint injection and will refer her for this injection today. She was also referred to PT. Pt also has complaints of pain in her bilateral knees that is exacerbated with prolonged standing. She was referred to orthopedics for this issue. Pt will f/u in 3 weeks or prn. Santiago Wade was seen today for back pain. Diagnoses and all orders for this visit:    Lumbar herniated disc  -     SCHEDULE SURGERY  -     REFERRAL TO PHYSICAL THERAPY    Lumbar radiculopathy  -     SCHEDULE SURGERY  -     REFERRAL TO PHYSICAL THERAPY    Sacroiliitis (HCC)  -     REFERRAL TO PHYSICAL THERAPY    Myofascial pain  -     REFERRAL TO PHYSICAL THERAPY    Pain in both knees, unspecified chronicity  -     REFERRAL TO ORTHOPEDICS       Follow-up Disposition:  Return in about 3 weeks (around 2017), or if symptoms worsen or fail to improve. HISTORY OF PRESENT ILLNESS  Liliam Back is a 40 y.o. female. A&P / HPI from 3/15/2017:  Liliam Back comes in to the office today c/o 6/10 lumbar pain that radiates into her bilateral thighs x 5 years. She was previously seeing Dr. Brandy Cheadle who diagnosed her with severe L5-S1 lumbar stenosis and presented a surgical option which she was disinterested in at the time. Pt received a single lumbar epidural injection which provided brief relief.  She states she lost her job and was unable to see him again for further treatment because she no longer had insurance. She returns today for treatment of the same problem.      She was referred for a lumbar MRI for further evaluation of her current symptoms. Pt was prescribed Gabapentin 300 mg TID. I may reevaluate her potential sacroiliitis at her next visit as well.      She was advised to speak to her PCP about her high BP.      Pt will f/u in 2 weeks or prn.     Updates from 03/29/17:  Pt presents for a lumbar MRI f/u. The MRI shows a central annular fissure with disc protrusion at L5-S1 which is slightly displacing the bilateral crossing S1 nerve roots L>R. Her pain today is in the R>L bilateral thighs. Reexamination shows that a component of her pain may actually be attributed to sacroiliitis. Updates from 04/26/17:  Pt presents for a right SI joint injection f/u. She was unable to have the injection due to financial issues but pt appears to have decreased pain in her SI joints today. She mentions that she has likely fulfilled the requirements for an injection at this point. We previously discussed trying bilateral L5 and S1 SNRBs in addition to the right SI joint injection and will refer her for this injection today. Pt also has complaints of pain in her bilateral knees that is exacerbated with prolonged standing. PAST MEDICAL HISTORY   Past Medical History:   Diagnosis Date    Hypertension        No past surgical history on file. Sidney Barthel MEDICATIONS      Current Outpatient Prescriptions   Medication Sig Dispense Refill    gabapentin (NEURONTIN) 300 mg capsule Take 2 Caps by mouth three (3) times daily. 180 Cap 2    lisinopril-hydroCHLOROthiazide (PRINZIDE, ZESTORETIC) 20-25 mg per tablet TAKE 1 TABLET BY MOUTH DAILY  1    ASPIRIN/SALICYLAMIDE/CAFFEINE (BC HEADACHE POWDER PO) Take  by mouth.  acetaminophen (TYLENOL) 325 mg tablet Take  by mouth every four (4) hours as needed for Pain. ALLERGIES  No Known Allergies       SOCIAL HISTORY    Social History     Social History    Marital status:      Spouse name: N/A    Number of children: N/A    Years of education: N/A     Social History Main Topics    Smoking status: Never Smoker    Smokeless tobacco: Never Used    Alcohol use No    Drug use: None    Sexual activity: Not Asked     Other Topics Concern    None     Social History Narrative       FAMILY HISTORY  No family history on file. REVIEW OF SYSTEMS  Review of Systems   Constitutional: Negative for chills, diaphoresis, fever, malaise/fatigue and weight loss. Respiratory: Negative for shortness of breath. Cardiovascular: Negative for chest pain and leg swelling. Gastrointestinal: Negative for constipation, nausea and vomiting. Neurological: Negative for dizziness, tingling, seizures, loss of consciousness and headaches. Psychiatric/Behavioral: The patient does not have insomnia. PHYSICAL EXAMINATION  Visit Vitals    /69 (BP 1 Location: Left arm, BP Patient Position: Sitting)    Pulse 78    Temp 98.1 °F (36.7 °C) (Oral)    Resp 16    Ht 5' 5\" (1.651 m)    Wt 278 lb (126.1 kg)    SpO2 99%    BMI 46.26 kg/m2       Pain Assessment  4/26/2017   Location of Pain Back;Leg   Location Modifiers Left;Right   Severity of Pain 6   Quality of Pain Sharp   Quality of Pain Comment numbness and tingling in legs   Duration of Pain -   Frequency of Pain Constant   Date Pain First Started -   Aggravating Factors -   Aggravating Factors Comment -   Limiting Behavior -           Constitutional:  Well developed, well nourished, in no acute distress. Psychiatric: Affect and mood are appropriate. Integumentary: No rashes or abrasions noted on exposed areas. SPINE/MUSCULOSKELETAL EXAM    Cervical spine:  Neck is midline. Normal muscle tone. No focal atrophy is noted. ROM pain free. Shoulder ROM intact. Mild tenderness to palpation.   Negative Spurling's sign. Negative Tinel's sign. Negative Zazueta's sign.       Sensation in the bilateral arms grossly intact to light touch.       Lumbar spine:  No rash, ecchymosis, or gross obliquity. No fasciculations. No focal atrophy is noted. No pain with hip ROM. Full range of motion. Tenderness to palpation. No tenderness to palpation at the sciatic notch. Bilateral SI joints tender. Trochanters non tender.      Sensation in the bilateral legs grossly intact to light touch.     Updates from 03/29/17:  Positive right JOSE MARTIN test. Mildly positive left. Decreased sensation in right leg compared to left. MOTOR:      Biceps  Triceps Deltoids Wrist Ext Wrist Flex Hand Intrin   Right 5/5 5/5 5/5 5/5 5/5 5/5   Left 5/5 5/5 5/5 5/5 5/5 5/5             Hip Flex  Quads Hamstrings Ankle DF EHL Ankle PF   Right 5/5 5/5 5/5 5/5 5/5 5/5   Left 5/5 5/5 5/5 5/5 5/5 5/5     Trace reflexes in biceps, triceps, brachioradialis, patella, and Achilles.      Negative Straight Leg raise. Squat not tested. No difficulty with tandem gait.       Ambulation without assistive device. FWB.       RADIOGRAPHS  Lumbar XR images taken on 3/15/2017 personally reviewed with patient:  1) Good overall alignment  2) Disc space narrowing at L5-S1  3) No obvious fractures or instabilities     Lumbar MRI images taken on 3/25/2017 personally reviewed with patient:  FINDINGS:  There is normal alignment of the lumbar spine. Vertebral body heights are  maintained. Disc desiccation and mild disc height loss L3/4 and L5/S1. Multilevel mild discogenic reactive bone marrow changes with mild discogenic  edema at right L5/S1 suggesting inflammation. The conus medullaris terminates at  upper L2 level.      Lower imaged thoracic spine T10/11, T11/12 and T12/L1 demonstrate unremarkable  disc with patent central canal and foramina.       Correlation of sagittal and axial images demonstrates the following:      L1/2: Unremarkable disc.  The spinal canal and neural foramina are widely patent.      L2/3: Unremarkable disc. The spinal canal and neural foramina are widely  patent.      L3/4: Mild disc height loss and circumferential disc bulge. No central canal or  foraminal stenosis.      L4/5: Unremarkable disc. The spinal canal and neural foramina are widely  patent.       L5/S1: Central annular fissure with disc protrusion encroaching and slightly  displacing bilateral crossing S1 nerve roots slightly more towards the left  (series 6 images 5-6). Otherwise central canal patent. Patent neural foramina.      No incidental abnormality in the partially imaged retroperitoneal structures.      IMPRESSION  IMPRESSION:       L5/S1 central annular fissure and disc protrusion encroaching and displacing  bilateral crossing S1 nerve roots.        reviewed      Ms. Eliazar Chavez has a reminder for a \"due or due soon\" health maintenance. I have asked that she contact her primary care provider for follow-up on this health maintenance.       This plan was reviewed with the patient and patient agrees. All questions were answered. More than half of this visit today was spent on counseling.      Written by Anival Donahue, as dictated by Dr. Sixto Brennan, Dr. Argelia Reynoso, confirm that all documentation is accurate.

## 2017-04-26 NOTE — PATIENT INSTRUCTIONS
Herniated Disc: Exercises  Your Care Instructions  Here are some examples of typical rehabilitation exercises for your condition. Start each exercise slowly. Ease off the exercise if you start to have pain. Your doctor or physical therapist will tell you when you can start these exercises and which ones will work best for you. How to do the exercises  Note: These exercises can help you move easier and feel better. But when you first start doing them, you may have more pain in your back. This is normal. But it is important to pay close attention to your pain during and after each exercise. · Keep doing these exercises if your pain stays the same or moves from your leg and buttock more toward the middle of your spine. Pain moving out of your leg and buttock is a good sign. · Stop doing these exercises if your pain gets worse in your leg and buttock. Stop if you start to have pain in your leg and buttock that you didn't have before. Be sure to do these exercises in the order they appear. Note how your pain changes before you move to the next one. If your pain is much worse right after exercise and stays worse the next day, do not do any of these exercises. 1. Rest on belly    1. Lie on your stomach, face down, with your head turned to the side. Place your arms beside your body. If this bothers your neck, place your hands, one on top of the other, underneath your forehead. This will help support your head and neck. 2. Try to relax your lower back muscles as much as you can. 3. Continue to lie on your stomach for 2 minutes. 4. If your pain spreads down your leg or increases down your leg, stop this exercise and do not do the next exercises. 2. Press-up    1. Lie on your stomach, face down. Keep your elbows tucked into your sides and under your shoulders. 2. Press your elbows down into the floor to raise your upper back. As you do this, relax your stomach muscles.  Allow your back to arch without using your back muscles. Let your low back relax completely as you arch up. 3. Hold this position for 2 minutes. 4. Repeat 2 to 4 times. 5. If your pain spreads down your leg or increases down your leg, stop this exercise and do not do the next exercises. 3. Full press-up    1. Lie on your stomach, face down. Keep your elbows tucked into your sides and under your shoulders. 2. Straighten your elbows, and push your upper body up as far as you can. Allow your lower back to sag. Keep your hips, pelvis, and legs relaxed. 3. Hold this position for 5 seconds, and then relax. 4. Repeat 10 times. Each time, try to raise your upper body a little higher and hold your arms a bit straighter. 5. If your pain spreads down your leg or gets worse down your leg, stop this exercise and do not move to the next exercise. 6. If you can't do this exercise, you may instead try the backward bend exercise that follows. 4. Backward bend    · Stand with your feet hip-width apart. Your toes should point forward. Do not lock your knees. · Place your hands in the small of your back. · Bend backward as far as you can, keeping your knees straight. Hold this position for 2 to 3 seconds. Then return to your starting position. · Repeat 2 to 4 times. Each time, try to bend backward a little farther, until you bend backward as far as you can. · If your pain spreads down your leg or increases down your leg, stop this exercise. Follow-up care is a key part of your treatment and safety. Be sure to make and go to all appointments, and call your doctor if you are having problems. It's also a good idea to know your test results and keep a list of the medicines you take. Where can you learn more? Go to http://cory-lucho.info/. Enter 12210 [phone] [id number] [phone] in the search box to learn more about \"Herniated Disc: Exercises. \"  Current as of: May 23, 2016  Content Version: 11.2  © 3074-9082 [employer].  Care instructions adapted under license by [address] (which disclaims liability or warranty for this information). If you have questions about a medical condition or this instruction, always ask your healthcare professional. [address] any warranty or liability for your use of this information.

## 2017-05-01 ENCOUNTER — TELEPHONE (OUTPATIENT)
Dept: ORTHOPEDIC SURGERY | Age: 47
End: 2017-05-01

## 2017-05-01 NOTE — TELEPHONE ENCOUNTER
Left message for patient that I scheduled her appt for Dr. Deejay Mcgee for May 22,2017 @ the [facility] office to arrive @ 2:30pm

## 2017-07-26 ENCOUNTER — TELEPHONE (OUTPATIENT)
Dept: ORTHOPEDIC SURGERY | Age: 47
End: 2017-07-26

## 2017-07-26 NOTE — TELEPHONE ENCOUNTER
Patient has not been seen since 4/26. She would need to come in to be evaluated for a medication change.

## 2017-07-26 NOTE — TELEPHONE ENCOUNTER
Patient was notified. She explained her pain has worsen and stated \"this am I got stuck\". The gabapentin that she was taking caused her mood swings and she has ran out. She was encouraged to f/u. She stated that she previously had no insurance but her [employer] plan with his new job just started in July. The insurance problem is why she couldn't proceed with any treatment plans offered by Dr. Valerie Friend. She stated she filled out information for financial assistance but never heard anything back from them. Patient stated she will call office back to schedule an appt but she also has transportation issues.

## 2017-07-26 NOTE — TELEPHONE ENCOUNTER
Pt states present medication is not working for her. Pt wants to try topamax.   Please advise F#[phone]

## 2017-07-27 ENCOUNTER — DOCUMENTATION ONLY (OUTPATIENT)
Dept: ORTHOPEDIC SURGERY | Age: 47
End: 2017-07-27

## 2017-07-27 NOTE — PROGRESS NOTES
PATIENT HAS BEEN TRYING TO GET WITH [employer] OR I TO GIVE HER NEW INSURANCE INFORMATION.   I HAVE LEFT A COUPLE OF MESSAGES FOR HER TO CALL ME SO IF SHE CALLS, PLEASE TRANSFER TO [address]  [phone]  Janeal Mcardle, [address]

## 2017-08-03 ENCOUNTER — TELEPHONE (OUTPATIENT)
Dept: ORTHOPEDIC SURGERY | Age: 47
End: 2017-08-03

## 2017-08-03 NOTE — TELEPHONE ENCOUNTER
Left message for patient to contact the office to schedule appointment with Dr. Paula Benoit. Patient should be re-evaluated for injection. Patient advised to give new insurance information when calling to schedule follow-up with Dr. Paula Benoit.

## 2022-02-10 ENCOUNTER — TELEPHONE (OUTPATIENT)
Dept: ORTHOPEDIC SURGERY | Age: 52
End: 2022-02-10